# Patient Record
Sex: FEMALE | Race: BLACK OR AFRICAN AMERICAN | NOT HISPANIC OR LATINO | URBAN - METROPOLITAN AREA
[De-identification: names, ages, dates, MRNs, and addresses within clinical notes are randomized per-mention and may not be internally consistent; named-entity substitution may affect disease eponyms.]

---

## 2017-04-18 ENCOUNTER — EMERGENCY (EMERGENCY)
Facility: HOSPITAL | Age: 47
LOS: 1 days | Discharge: HOME | End: 2017-04-18
Attending: EMERGENCY MEDICINE | Admitting: EMERGENCY MEDICINE
Payer: COMMERCIAL

## 2017-04-18 VITALS
SYSTOLIC BLOOD PRESSURE: 150 MMHG | OXYGEN SATURATION: 100 % | DIASTOLIC BLOOD PRESSURE: 90 MMHG | HEART RATE: 106 BPM | TEMPERATURE: 100 F | RESPIRATION RATE: 18 BRPM

## 2017-04-18 VITALS
OXYGEN SATURATION: 98 % | TEMPERATURE: 99 F | DIASTOLIC BLOOD PRESSURE: 77 MMHG | HEART RATE: 86 BPM | RESPIRATION RATE: 18 BRPM | SYSTOLIC BLOOD PRESSURE: 136 MMHG

## 2017-04-18 DIAGNOSIS — O02.1 MISSED ABORTION: Chronic | ICD-10-CM

## 2017-04-18 DIAGNOSIS — Z98.89 OTHER SPECIFIED POSTPROCEDURAL STATES: Chronic | ICD-10-CM

## 2017-04-18 LAB
ALBUMIN SERPL ELPH-MCNC: 4.1 G/DL — SIGNIFICANT CHANGE UP (ref 3.3–5)
ALP SERPL-CCNC: 67 U/L — SIGNIFICANT CHANGE UP (ref 40–120)
ALT FLD-CCNC: 8 U/L — SIGNIFICANT CHANGE UP (ref 4–33)
AST SERPL-CCNC: 17 U/L — SIGNIFICANT CHANGE UP (ref 4–32)
BASOPHILS # BLD AUTO: 0.01 K/UL — SIGNIFICANT CHANGE UP (ref 0–0.2)
BASOPHILS NFR BLD AUTO: 0.3 % — SIGNIFICANT CHANGE UP (ref 0–2)
BILIRUB SERPL-MCNC: 0.2 MG/DL — SIGNIFICANT CHANGE UP (ref 0.2–1.2)
BUN SERPL-MCNC: 16 MG/DL — SIGNIFICANT CHANGE UP (ref 7–23)
CALCIUM SERPL-MCNC: 9.1 MG/DL — SIGNIFICANT CHANGE UP (ref 8.4–10.5)
CHLORIDE SERPL-SCNC: 98 MMOL/L — SIGNIFICANT CHANGE UP (ref 98–107)
CO2 SERPL-SCNC: 23 MMOL/L — SIGNIFICANT CHANGE UP (ref 22–31)
CREAT SERPL-MCNC: 0.94 MG/DL — SIGNIFICANT CHANGE UP (ref 0.5–1.3)
EOSINOPHIL # BLD AUTO: 0.04 K/UL — SIGNIFICANT CHANGE UP (ref 0–0.5)
EOSINOPHIL NFR BLD AUTO: 1 % — SIGNIFICANT CHANGE UP (ref 0–6)
GLUCOSE SERPL-MCNC: 91 MG/DL — SIGNIFICANT CHANGE UP (ref 70–99)
HCT VFR BLD CALC: 35.7 % — SIGNIFICANT CHANGE UP (ref 34.5–45)
HGB BLD-MCNC: 11.4 G/DL — LOW (ref 11.5–15.5)
IMM GRANULOCYTES NFR BLD AUTO: 0 % — SIGNIFICANT CHANGE UP (ref 0–1.5)
LYMPHOCYTES # BLD AUTO: 0.86 K/UL — LOW (ref 1–3.3)
LYMPHOCYTES # BLD AUTO: 21.9 % — SIGNIFICANT CHANGE UP (ref 13–44)
MCHC RBC-ENTMCNC: 26.5 PG — LOW (ref 27–34)
MCHC RBC-ENTMCNC: 31.9 % — LOW (ref 32–36)
MCV RBC AUTO: 83 FL — SIGNIFICANT CHANGE UP (ref 80–100)
MONOCYTES # BLD AUTO: 0.52 K/UL — SIGNIFICANT CHANGE UP (ref 0–0.9)
MONOCYTES NFR BLD AUTO: 13.3 % — SIGNIFICANT CHANGE UP (ref 2–14)
NEUTROPHILS # BLD AUTO: 2.49 K/UL — SIGNIFICANT CHANGE UP (ref 1.8–7.4)
NEUTROPHILS NFR BLD AUTO: 63.5 % — SIGNIFICANT CHANGE UP (ref 43–77)
PLATELET # BLD AUTO: 266 K/UL — SIGNIFICANT CHANGE UP (ref 150–400)
PMV BLD: 9.8 FL — SIGNIFICANT CHANGE UP (ref 7–13)
POTASSIUM SERPL-MCNC: 3.9 MMOL/L — SIGNIFICANT CHANGE UP (ref 3.5–5.3)
POTASSIUM SERPL-SCNC: 3.9 MMOL/L — SIGNIFICANT CHANGE UP (ref 3.5–5.3)
PROT SERPL-MCNC: 7.2 G/DL — SIGNIFICANT CHANGE UP (ref 6–8.3)
RBC # BLD: 4.3 M/UL — SIGNIFICANT CHANGE UP (ref 3.8–5.2)
RBC # FLD: 14.5 % — SIGNIFICANT CHANGE UP (ref 10.3–14.5)
SODIUM SERPL-SCNC: 136 MMOL/L — SIGNIFICANT CHANGE UP (ref 135–145)
WBC # BLD: 3.92 K/UL — SIGNIFICANT CHANGE UP (ref 3.8–10.5)
WBC # FLD AUTO: 3.92 K/UL — SIGNIFICANT CHANGE UP (ref 3.8–10.5)

## 2017-04-18 PROCEDURE — 99285 EMERGENCY DEPT VISIT HI MDM: CPT

## 2017-04-18 PROCEDURE — 71020: CPT | Mod: 26

## 2017-04-18 RX ORDER — ACETAMINOPHEN 500 MG
650 TABLET ORAL ONCE
Qty: 0 | Refills: 0 | Status: COMPLETED | OUTPATIENT
Start: 2017-04-18 | End: 2017-04-18

## 2017-04-18 RX ORDER — KETOROLAC TROMETHAMINE 30 MG/ML
15 SYRINGE (ML) INJECTION ONCE
Qty: 0 | Refills: 0 | Status: DISCONTINUED | OUTPATIENT
Start: 2017-04-18 | End: 2017-04-18

## 2017-04-18 RX ORDER — METOCLOPRAMIDE HCL 10 MG
10 TABLET ORAL ONCE
Qty: 0 | Refills: 0 | Status: COMPLETED | OUTPATIENT
Start: 2017-04-18 | End: 2017-04-18

## 2017-04-18 RX ORDER — SODIUM CHLORIDE 9 MG/ML
1000 INJECTION INTRAMUSCULAR; INTRAVENOUS; SUBCUTANEOUS ONCE
Qty: 0 | Refills: 0 | Status: COMPLETED | OUTPATIENT
Start: 2017-04-18 | End: 2017-04-18

## 2017-04-18 RX ADMIN — Medication 104 MILLIGRAM(S): at 21:20

## 2017-04-18 RX ADMIN — Medication 650 MILLIGRAM(S): at 21:15

## 2017-04-18 RX ADMIN — SODIUM CHLORIDE 1000 MILLILITER(S): 9 INJECTION INTRAMUSCULAR; INTRAVENOUS; SUBCUTANEOUS at 21:15

## 2017-04-18 NOTE — ED PROVIDER NOTE - MEDICAL DECISION MAKING DETAILS
46 year old female with a PMHx of HTN pw 2 days of productive cough, sore throat, HA, body aches and chest pain that began today this evening.  Plan: CBC, CMP, CXR, IVF, tylenol, reglan

## 2017-04-18 NOTE — ED ADULT NURSE NOTE - OBJECTIVE STATEMENT
received pt to intake room 12 for evaluation of bodyaches, fever and cough with chest discomfort x couple days. pt presents awake a&ox4, denies dizziness, c/o ha. skin warm, dry, intact, appropriate for race. respirations even unlabored. abdomen soft nontender nondistended. denies n/v/d, endorses fevers and chills. ivl placed bloods drawn and sent. NS0.9% bolus infusing. pt medicated with tylenol, family at bedside.

## 2017-04-18 NOTE — ED PROVIDER NOTE - OBJECTIVE STATEMENT
46 year old female with a PMHx of HTN pw 2 days of cough, HA, body aches and chest pain that began today this evening. 46 year old female with a PMHx of HTN pw 2 days of productive cough, sore throat, HA, body aches and chest pain that began today this evening. Chest pain is intermittent, sharp, sternal non-radiating, 7/10 in severity, worse with deep breathing and coughing. Tmax 102 at home. Last dose of Tylenol 11:00. Denies visual changes, nasal discharge, drooling, diaphoresis, hemoptysis, SOB, abdominal pain, n/v, diarrhea, swelling/pain in the calves, sick contacts, foreign travel, hx of clots.

## 2017-04-18 NOTE — ED PROVIDER NOTE - CARE PLAN
Principal Discharge DX:	Cough  Instructions for follow-up, activity and diet:	Drink plenty of fluids - stay hydrated.  Take Tessalon Perles 100mg three times a day for cough for the next 7 days.  Take Motrin 600mg every 6 hours for mild-moderate pain - alternate with Tylenol 650mg every 4 hours for pain.  Follow up with your primary care physician within 48 hours for further evaluation.  Return to the Emergency Department for any new, worsening or concerning symptoms.

## 2017-04-18 NOTE — ED PROVIDER NOTE - PROGRESS NOTE DETAILS
MAINE Hernandez: pt NAD, Vitals improved, headache improved after medication. Discussed the results of the labs/imaging. Advised to follow up with her PCP.

## 2017-04-18 NOTE — ED PROVIDER NOTE - ATTENDING CONTRIBUTION TO CARE
ED Attending Dr. Thomas: 47 yo female with HTN in ED with 2 days of cough, throat pain, HA and body aches/chest pain.  On exam pt uncomfortable appearing, heart RRR, lungs CTAB, abd NTND, extremities without swelling, strength 5/5 in all extremities and skin without rash.

## 2017-04-18 NOTE — ED PROVIDER NOTE - PLAN OF CARE
Drink plenty of fluids - stay hydrated.  Take Tessalon Perles 100mg three times a day for cough for the next 7 days.  Take Motrin 600mg every 6 hours for mild-moderate pain - alternate with Tylenol 650mg every 4 hours for pain.  Follow up with your primary care physician within 48 hours for further evaluation.  Return to the Emergency Department for any new, worsening or concerning symptoms.

## 2017-04-19 RX ORDER — ONDANSETRON 8 MG/1
1 TABLET, FILM COATED ORAL
Qty: 21 | Refills: 0 | OUTPATIENT
Start: 2017-04-19 | End: 2017-04-26

## 2017-04-19 RX ORDER — FAMOTIDINE 10 MG/ML
1 INJECTION INTRAVENOUS
Qty: 15 | Refills: 0 | OUTPATIENT
Start: 2017-04-19 | End: 2017-05-04

## 2017-04-19 RX ORDER — IBUPROFEN 200 MG
1 TABLET ORAL
Qty: 28 | Refills: 0 | OUTPATIENT
Start: 2017-04-19 | End: 2017-04-26

## 2017-04-19 RX ADMIN — Medication 15 MILLIGRAM(S): at 00:03
